# Patient Record
Sex: MALE | Race: WHITE | Employment: FULL TIME | URBAN - METROPOLITAN AREA
[De-identification: names, ages, dates, MRNs, and addresses within clinical notes are randomized per-mention and may not be internally consistent; named-entity substitution may affect disease eponyms.]

---

## 2023-10-25 ENCOUNTER — HOSPITAL ENCOUNTER (EMERGENCY)
Age: 18
Discharge: HOME | End: 2023-10-25
Attending: EMERGENCY MEDICINE
Payer: COMMERCIAL

## 2023-10-25 VITALS
HEART RATE: 83 BPM | RESPIRATION RATE: 19 BRPM | WEIGHT: 315 LBS | HEIGHT: 71 IN | TEMPERATURE: 97.5 F | DIASTOLIC BLOOD PRESSURE: 89 MMHG | SYSTOLIC BLOOD PRESSURE: 127 MMHG | BODY MASS INDEX: 44.1 KG/M2 | OXYGEN SATURATION: 98 %

## 2023-10-25 DIAGNOSIS — J06.9 VIRAL URI WITH COUGH: Primary | ICD-10-CM

## 2023-10-25 LAB
SARS-COV-2 RDRP RESP QL NAA+PROBE: NOT DETECTED
SOURCE: NORMAL

## 2023-10-25 PROCEDURE — 99283 EMERGENCY DEPT VISIT LOW MDM: CPT

## 2023-10-25 PROCEDURE — 87635 SARS-COV-2 COVID-19 AMP PRB: CPT

## 2023-10-25 RX ORDER — BENZONATATE 200 MG/1
200 CAPSULE ORAL 3 TIMES DAILY PRN
Qty: 30 CAPSULE | Refills: 0 | Status: SHIPPED | OUTPATIENT
Start: 2023-10-25 | End: 2023-11-04

## 2023-10-25 ASSESSMENT — ENCOUNTER SYMPTOMS
ABDOMINAL PAIN: 0
WHEEZING: 0
COUGH: 1
NAUSEA: 0
SHORTNESS OF BREATH: 0
CHEST TIGHTNESS: 1
VOMITING: 0
STRIDOR: 0

## 2023-10-25 ASSESSMENT — LIFESTYLE VARIABLES
HOW OFTEN DO YOU HAVE A DRINK CONTAINING ALCOHOL: NEVER
HOW MANY STANDARD DRINKS CONTAINING ALCOHOL DO YOU HAVE ON A TYPICAL DAY: PATIENT DOES NOT DRINK

## 2023-10-25 ASSESSMENT — PAIN - FUNCTIONAL ASSESSMENT: PAIN_FUNCTIONAL_ASSESSMENT: NONE - DENIES PAIN

## 2023-10-25 NOTE — ED PROVIDER NOTES
Emergency Department Provider Note       PCP: Scott Eid MD   Age: 19 y.o. Sex: male     DISPOSITION Decision To Discharge 10/25/2023 12:15:31 PM       ICD-10-CM    1. Viral URI with cough  J06.9           Medical Decision Making     Complexity of Problems Addressed:    1 or more stable acute illnesses/injury    Data Reviewed and Analyzed:  I independently ordered and reviewed each unique test.             Discussion of management or test interpretation. 78-year-old presents with 1 day history of URI symptoms. Other than nasal congestion exam unremarkable. COVID-negative. Patient be discharged home with [facility] [place] for cough. Risk of Complications and/or Morbidity of Patient Management:  Prescription drug management performed. History       78-year-old male with no other significant past medical history presents to the emergency department complaining of awakening this morning with sinus congestion and cough productive of whitish sputum. He denies any fever chills nausea or vomiting. At the suggestion of his mother, it was thought he should come in to be checked for possible bronchitis. Patient is non-smoker with no history of lung issues. The history is provided by the patient. Review of Systems   Constitutional:  Negative for chills and fever. HENT:  Positive for congestion. Respiratory:  Positive for cough and chest tightness (Mild). Negative for shortness of breath, wheezing and stridor. Cardiovascular:  Negative for chest pain, palpitations and leg swelling. Gastrointestinal:  Negative for abdominal pain, nausea and vomiting. All other systems reviewed and are negative.       Physical Exam     Vitals signs and nursing note reviewed:  Vitals:    10/25/23 1101 10/25/23 1111 10/25/23 1133 10/25/23 1203   BP: (!) 142/122 135/87 137/84 127/89   Pulse:       Resp:       Temp:       TempSrc:       SpO2: 98% 97% 98% 98%   Weight:       Height: